# Patient Record
Sex: MALE | Race: WHITE
[De-identification: names, ages, dates, MRNs, and addresses within clinical notes are randomized per-mention and may not be internally consistent; named-entity substitution may affect disease eponyms.]

---

## 2022-01-19 ENCOUNTER — HOSPITAL ENCOUNTER (OUTPATIENT)
Dept: HOSPITAL 47 - LABWHC1 | Age: 62
Discharge: HOME | End: 2022-01-19
Attending: INTERNAL MEDICINE
Payer: COMMERCIAL

## 2022-01-19 ENCOUNTER — HOSPITAL ENCOUNTER (OUTPATIENT)
Dept: HOSPITAL 47 - LABPAT | Age: 62
Discharge: HOME | End: 2022-01-19
Attending: UROLOGY
Payer: COMMERCIAL

## 2022-01-19 DIAGNOSIS — R35.0: ICD-10-CM

## 2022-01-19 DIAGNOSIS — C61: ICD-10-CM

## 2022-01-19 DIAGNOSIS — I10: Primary | ICD-10-CM

## 2022-01-19 DIAGNOSIS — E78.5: ICD-10-CM

## 2022-01-19 DIAGNOSIS — Z01.812: Primary | ICD-10-CM

## 2022-01-19 LAB
BASOPHILS # BLD AUTO: 0 K/UL (ref 0–0.2)
BASOPHILS NFR BLD AUTO: 1 %
CHOLEST SERPL-MCNC: 184 MG/DL (ref 0–200)
EOSINOPHIL # BLD AUTO: 0.1 K/UL (ref 0–0.7)
EOSINOPHIL NFR BLD AUTO: 2 %
ERYTHROCYTE [DISTWIDTH] IN BLOOD BY AUTOMATED COUNT: 4.77 M/UL (ref 4.3–5.9)
ERYTHROCYTE [DISTWIDTH] IN BLOOD: 13.1 % (ref 11.5–15.5)
HCT VFR BLD AUTO: 47 % (ref 39–53)
HDLC SERPL-MCNC: 109 MG/DL (ref 40–60)
HGB BLD-MCNC: 15.8 GM/DL (ref 13–17.5)
LDLC SERPL CALC-MCNC: 62.4 MG/DL (ref 0–131)
LYMPHOCYTES # SPEC AUTO: 0.7 K/UL (ref 1–4.8)
LYMPHOCYTES NFR SPEC AUTO: 14 %
MCH RBC QN AUTO: 33.2 PG (ref 25–35)
MCHC RBC AUTO-ENTMCNC: 33.6 G/DL (ref 31–37)
MCV RBC AUTO: 98.7 FL (ref 80–100)
MONOCYTES # BLD AUTO: 0.3 K/UL (ref 0–1)
MONOCYTES NFR BLD AUTO: 5 %
NEUTROPHILS # BLD AUTO: 3.9 K/UL (ref 1.3–7.7)
NEUTROPHILS NFR BLD AUTO: 77 %
PH UR: 5.5 [PH] (ref 5–8)
PLATELET # BLD AUTO: 144 K/UL (ref 150–450)
SP GR UR: 1.01 (ref 1–1.03)
TRIGL SERPL-MCNC: 63 MG/DL (ref 0–149)
UROBILINOGEN UR QL STRIP: <2 MG/DL (ref ?–2)
VLDLC SERPL CALC-MCNC: 12.6 MG/DL (ref 5–40)
WBC # BLD AUTO: 5 K/UL (ref 3.8–10.6)

## 2022-01-19 PROCEDURE — 80053 COMPREHEN METABOLIC PANEL: CPT

## 2022-01-19 PROCEDURE — 83721 ASSAY OF BLOOD LIPOPROTEIN: CPT

## 2022-01-19 PROCEDURE — 81003 URINALYSIS AUTO W/O SCOPE: CPT

## 2022-01-19 PROCEDURE — 36415 COLL VENOUS BLD VENIPUNCTURE: CPT

## 2022-01-19 PROCEDURE — 80061 LIPID PANEL: CPT

## 2022-01-19 PROCEDURE — 87086 URINE CULTURE/COLONY COUNT: CPT

## 2022-01-19 PROCEDURE — 85025 COMPLETE CBC W/AUTO DIFF WBC: CPT

## 2022-01-20 LAB
ALBUMIN SERPL-MCNC: 4.7 G/DL (ref 3.8–4.9)
ALP SERPL-CCNC: 62 U/L (ref 41–126)
ALT SERPL-CCNC: 26 U/L (ref 10–49)
ANION GAP SERPL CALC-SCNC: 11.3 MMOL/L (ref 10–18)
AST SERPL-CCNC: 25 U/L (ref 14–35)
BUN SERPL-SCNC: 17.5 MG/DL (ref 9–27)
CALCIUM SPEC-MCNC: 9.9 MG/DL (ref 8.7–10.3)
CHLORIDE SERPL-SCNC: 98 MMOL/L (ref 96–109)
CO2 SERPL-SCNC: 25.5 MMOL/L (ref 20–27.5)
GLUCOSE SERPL-MCNC: 80 MG/DL (ref 70–110)
POTASSIUM SERPL-SCNC: 3.9 MMOL/L (ref 3.5–5.5)
PROT SERPL-MCNC: 7 G/DL (ref 6.2–8.2)
SODIUM SERPL-SCNC: 135 MMOL/L (ref 135–145)

## 2022-01-27 NOTE — P.HPIHPCON
History of Present Illness


H&P Date: 01/27/22


Chief Complaint: Prostate cancer


This is a 61-year-old male with history of high-volume [place] 6 prostate 

cancer, PSA of 16.2.  Option of continued observation, robotic prostatectomy, 

and radiation were discussed with him in detail.  Risk and benefit of each 

approach was discussed.  He agreed to proceed with a robotic radical 

prostatectomy.  Discussed with him the risk which includes but not limited to 

bleeding, infection, injury to nearby organs which includes but not limited to 

bladder, urethra, rectum, bowel.  Discussed the risk of urinary incontinence and

erectile dysfunction.  Discussed potential of cancer recurrence even with 

surgical removal.  Discussed also risk from anesthesia which includes but not 

limited to heart attack, stroke, blood clots.  He understood all the risk and 

agreed to proceed with robotic-assisted laparoscopic prostatectomy





Consent for Procedure: 


I have explained the operation/procedure to the patient, including the risks, 

benefits, side effects, alternative therapies (including not receiving the 

proposed treatment or service), the likelihood of the patient achieving his/her 

goals, and potential recuperation problems for the procedure/sedation/analgesia,

as well as any blood products, if indicated. I also explained to the patient the

risks, benefits and side effects of the alternatives, as well as the risks 

related to not receiving the proposed procedure, care, treatment, or services.








Past Medical History


Past Medical History: Atrial Fibrillation, Cancer, Hyperlipidemia, Hypertension,

Thyroid Disorder


Additional Past Medical History / Comment(s): recent hx. of prostate cancer


History of Any Multi-Drug Resistant Organisms: None Reported


Additional Past Surgical History / Comment(s): cardioversion


Past Anesthesia/Blood Transfusion Reactions: No Reported Reaction


Smoking Status: Never smoker





Medications and Allergies


                                Home Medications











 Medication  Instructions  Recorded  Confirmed  Type


 


Levothyroxine Sodium [Synthroid] 50 mcg PO DAILY 06/20/19 01/25/22 History


 


Rivaroxaban [Xarelto] 20 mg PO DAILY 06/20/19 01/25/22 History


 


Spironolactone 25 mg PO DAILY #90 tablet 06/24/19 01/25/22 Rx


 


Losartan Potassium [Cozaar] 25 mg PO DAILY 01/25/22 01/25/22 History


 


Metoprolol Tartrate [Lopressor] 12.5 mg PO BID 01/25/22 01/25/22 History


 


Rosuvastatin [Crestor] 10 mg PO DAILY 01/25/22 01/25/22 History


 


hydroCHLOROthiazide [Hydrodiuril] 25 mg PO DAILY 01/25/22 01/25/22 History








                                    Allergies











Allergy/AdvReac Type Severity Reaction Status Date / Time


 


No Known Allergies Allergy   Verified 01/25/22 12:08














Surgical - Exam





- General


no distress, no pain





- Eyes


normal ocular movement, no pale





- ENT


normal nares, normal mucosa





- Respiratory


normal expansion, normal respiratory effort





- Abdomen


Abdomen: soft, non tender





- Psychiatric


oriented to time, oriented to person, oriented to place





Assessment and Plan


Assessment: 


61-year-old male with history of Radiant 6 prostate cancer


OR for robotic prostatectomy

## 2022-01-28 ENCOUNTER — HOSPITAL ENCOUNTER (OUTPATIENT)
Dept: HOSPITAL 47 - OR | Age: 62
Setting detail: OBSERVATION
LOS: 1 days | Discharge: HOME | End: 2022-01-29
Attending: UROLOGY | Admitting: UROLOGY
Payer: COMMERCIAL

## 2022-01-28 VITALS — BODY MASS INDEX: 22.1 KG/M2

## 2022-01-28 DIAGNOSIS — E07.9: ICD-10-CM

## 2022-01-28 DIAGNOSIS — Z79.01: ICD-10-CM

## 2022-01-28 DIAGNOSIS — I10: ICD-10-CM

## 2022-01-28 DIAGNOSIS — I42.0: ICD-10-CM

## 2022-01-28 DIAGNOSIS — Z98.890: ICD-10-CM

## 2022-01-28 DIAGNOSIS — Z79.890: ICD-10-CM

## 2022-01-28 DIAGNOSIS — C61: Primary | ICD-10-CM

## 2022-01-28 DIAGNOSIS — Z79.899: ICD-10-CM

## 2022-01-28 DIAGNOSIS — I48.91: ICD-10-CM

## 2022-01-28 DIAGNOSIS — E78.5: ICD-10-CM

## 2022-01-28 DIAGNOSIS — I48.3: ICD-10-CM

## 2022-01-28 LAB
ANION GAP SERPL CALC-SCNC: 4 MMOL/L
BUN SERPL-SCNC: 16 MG/DL (ref 9–20)
CALCIUM SPEC-MCNC: 9.1 MG/DL (ref 8.4–10.2)
CHLORIDE SERPL-SCNC: 97 MMOL/L (ref 98–107)
CO2 SERPL-SCNC: 30 MMOL/L (ref 22–30)
GLUCOSE SERPL-MCNC: 96 MG/DL (ref 74–99)
POTASSIUM SERPL-SCNC: 4.5 MMOL/L (ref 3.5–5.1)
SODIUM SERPL-SCNC: 131 MMOL/L (ref 137–145)

## 2022-01-28 PROCEDURE — 86900 BLOOD TYPING SEROLOGIC ABO: CPT

## 2022-01-28 PROCEDURE — 86901 BLOOD TYPING SEROLOGIC RH(D): CPT

## 2022-01-28 PROCEDURE — 87635 SARS-COV-2 COVID-19 AMP PRB: CPT

## 2022-01-28 PROCEDURE — 55866 LAPS SURG PRST8ECT RPBIC RAD: CPT

## 2022-01-28 PROCEDURE — 88305 TISSUE EXAM BY PATHOLOGIST: CPT

## 2022-01-28 PROCEDURE — 64999 UNLISTED PX NERVOUS SYSTEM: CPT

## 2022-01-28 PROCEDURE — 86850 RBC ANTIBODY SCREEN: CPT

## 2022-01-28 PROCEDURE — 80048 BASIC METABOLIC PNL TOTAL CA: CPT

## 2022-01-28 PROCEDURE — 88309 TISSUE EXAM BY PATHOLOGIST: CPT

## 2022-01-28 RX ADMIN — POTASSIUM CHLORIDE SCH MLS: 14.9 INJECTION, SOLUTION INTRAVENOUS at 06:24

## 2022-01-28 RX ADMIN — METOPROLOL TARTRATE SCH MG: 25 TABLET, FILM COATED ORAL at 19:51

## 2022-01-28 RX ADMIN — KETOROLAC TROMETHAMINE SCH MG: 30 INJECTION, SOLUTION INTRAMUSCULAR at 17:21

## 2022-01-28 RX ADMIN — KETOROLAC TROMETHAMINE SCH MG: 30 INJECTION, SOLUTION INTRAMUSCULAR at 13:01

## 2022-01-28 RX ADMIN — ATORVASTATIN CALCIUM SCH MG: 20 TABLET, FILM COATED ORAL at 16:13

## 2022-01-28 RX ADMIN — HEPARIN SODIUM SCH UNIT: 5000 INJECTION INTRAVENOUS; SUBCUTANEOUS at 16:13

## 2022-01-28 RX ADMIN — DEXTROSE MONOHYDRATE, SODIUM CHLORIDE, AND POTASSIUM CHLORIDE SCH MLS/HR: 50; 4.5; 1.49 INJECTION, SOLUTION INTRAVENOUS at 19:33

## 2022-01-28 RX ADMIN — DEXTROSE MONOHYDRATE, SODIUM CHLORIDE, AND POTASSIUM CHLORIDE SCH MLS/HR: 50; 4.5; 1.49 INJECTION, SOLUTION INTRAVENOUS at 13:02

## 2022-01-28 NOTE — P.ANPRN
Procedure Note - Anesthesia





- Nerve Block Performed


  ** Bilateral Erector Spinae Single


Time Out Performed: Yes


Date of Procedure: 01/28/22


Procedure Start Time: 07:11


Location of Patient: PreOp


Indication: Acute Post-Operative Pain, Requested by Surgeon


Sedation Type: Sedate with meaningful contact maintained


Preparation: Sterile Prep, Sterile Dressing


Position: Prone


Needle Types: Facet


Needle Gauge: 20, 21


Ultrasound used to visualize needle placement: Yes


Ultrasound used to observe medication spread: Yes


Injectate: 0.5% Ropivacaine (see comment for volume) (15 ml + decadron 4 mg per 

side)


Blood Aspirated: No


Pain Paresthesia on Injection Noted: No


Resistance on Injection: Normal


Image Stored and Saved: Yes


Events: Uneventful and Well Tolerated

## 2022-01-28 NOTE — P.OP
Date of Procedure: 01/28/22


Preoperative Diagnosis: 


prostate Cancer 


Postoperative Diagnosis: 


same 


Procedure(s) Performed: 


robotic assisted laproscopic prostatectomy 


Implants: 


none


Anesthesia: MIRIAM


Surgeon: Floyd Prince


Estimated Blood Loss (ml): 150


Pathology: other (prostate and bilateral seminal vesicle)


Condition: stable


Disposition: PACU


Indications for Procedure: 


This is a 61-year-old male with history of high-volume [place] 6 prostate 

cancer, PSA of 16.2.  Option of continued observation, robotic prostatectomy, 

and radiation were discussed with him in detail.  Risk and benefit of each 

approach was discussed.  He agreed to proceed with a robotic radical prostatecto

my.  Discussed with him the risk which includes but not limited to bleeding, 

infection, injury to nearby organs which includes but not limited to bladder, 

urethra, rectum, bowel.  Discussed the risk of urinary incontinence and erectile

dysfunction.  Discussed potential of cancer recurrence even with surgical 

removal.  Discussed also risk from anesthesia which includes but not limited to 

heart attack, stroke, blood clots.  He understood all the risk and agreed to 

proceed with robotic-assisted laparoscopic prostatectomy


Description of Procedure: 


After preoperative antibiotics were started, the patient was taken to the 

operating room. Anesthesia was induced and the patient was placed in  supine  

position, with adequate padding of the pressure points, shoulders, back, legs 

and arms. He was then prepped and draped in the standard fashion. A critical 

pause was performed using two patient identifiers.





A 16F farr catheter was placed to gravity drainage. A pneumo-peritoneum was 

created with placement of a Veress needle to 20 mm Hg without complication, and 

a 8 Fr trocar was placed above the umbillicus.  Under direct vision a 8mm 

robotic ports was placed lateral to each rectus slightly below the camera port. 

The left iliac fossa 8mm port was placed.  The right assistant right iliac fossa

12mm port and right paramedian 5mm portwere placed.  





After the patient was placed in the trendelenberg position, the robot was then 

docked to the 8mm robotic ports and then each robotic arm and tower was checked 

in relation to the patient's legs and hands to avoid inadvertent compression. 

The peritoneal cavity was inspected.  Adhesions  were  taken down along the left

lower quadrant sharply.





 An inverted U-shaped incision began laterally to the left medial umbilical 

ligament and extended high across the midline to the right umbilical ligament. 

The limbs of the "U" extended to the level of the vasa on both sides. We next 

developed the preperitoneal space and the space of Retzius. 





Cautery was used to dissected the bladder away from the prostate. After the 

anterior bladder neck was incised and the bladder entered the the posterior 

bladder neck was exposed and the ureteral orifces identified. The posterior 

bladder neck was then incised and dissected away from the prostate. The vas and 

the seminal vesicles were now exposed and dissected to their insertions into the

prostate and were not spared. The posterior layer of the Denonvillier's fascia 

was incised to enter ang the plane between prostate and perirectal fat. 





Each lateral pedicle was controlled with clips and cautery for hemostasis. Nerve

preservation was performed, complete nerve preservation was performed 

bilaterally  The puboprostatic ligament was incised where it inserted into the a

pex of the prostate and a plane between urethra and dorsal venous complex 

developed to expose the anterior urethral surface. The anterior wall of the 

urethra was transected with the cut setting a few millimeters distal to the apex

of the prostate. The dorsal vein was ligated using 3-0 V lock 


 





The urethrovesical anastomosis was performed . the posterior denovillers was 

reapproximated using 3-0 V lock.   A 6 and 6 inch 3-0 V-Lock suture was used to 

anastomose the urethra and bladder, starting at the 6:00 posterior position.  

Mucosa was secured in every stitch, to ensure a mucosa to mucosa anastomosis.  

The stitch was regularly cinched and the anastomosis tightened.  Care was taken 

to not violate the ureteral orifices.    The Farr catheter was advanced, the 

bladder filled, and the anastomosis was tested, as described above.  Anastomsis 

was watertight at 150mL





The periumbilical fascia was closed with 1-0-PDS suture in figure-of-eight 

fashion All ports were closed with a subcuticular 4-0 monocryl and Dermabond. 

Sponge, instrument, and needle counts were correct at the end of the case x2. 

All specimens including prostate and lymph nodes were sent to pathology for 

diagnosis and will be available in a week.





The patient tolerated the surgery well and without complication. He awoke 

without difficulty and was taken to the recovery room in stable condition

## 2022-01-29 VITALS
HEART RATE: 72 BPM | DIASTOLIC BLOOD PRESSURE: 85 MMHG | SYSTOLIC BLOOD PRESSURE: 152 MMHG | RESPIRATION RATE: 16 BRPM | TEMPERATURE: 98.3 F

## 2022-01-29 RX ADMIN — HEPARIN SODIUM SCH UNIT: 5000 INJECTION INTRAVENOUS; SUBCUTANEOUS at 00:36

## 2022-01-29 RX ADMIN — KETOROLAC TROMETHAMINE SCH MG: 30 INJECTION, SOLUTION INTRAMUSCULAR at 00:36

## 2022-01-29 RX ADMIN — HEPARIN SODIUM SCH UNIT: 5000 INJECTION INTRAVENOUS; SUBCUTANEOUS at 08:28

## 2022-01-29 RX ADMIN — POTASSIUM CHLORIDE SCH: 14.9 INJECTION, SOLUTION INTRAVENOUS at 07:05

## 2022-01-29 RX ADMIN — ATORVASTATIN CALCIUM SCH MG: 20 TABLET, FILM COATED ORAL at 08:27

## 2022-01-29 RX ADMIN — KETOROLAC TROMETHAMINE SCH: 30 INJECTION, SOLUTION INTRAMUSCULAR at 06:32

## 2022-01-29 RX ADMIN — METOPROLOL TARTRATE SCH MG: 25 TABLET, FILM COATED ORAL at 08:27

## 2022-01-29 RX ADMIN — DEXTROSE MONOHYDRATE, SODIUM CHLORIDE, AND POTASSIUM CHLORIDE SCH: 50; 4.5; 1.49 INJECTION, SOLUTION INTRAVENOUS at 06:32

## 2022-01-29 NOTE — P.DS
Providers


Date of admission: 


01/29/22 02:06





Attending physician: 


Floyd Prince MD





Primary care physician: 


[facility] Course: 





The patient was admitted for a robotic-assisted radical prostatectomy.  He 

underwent this without difficulty.  His postoperative course was uneventful.  He

tolerated diet.  His vital signs are stable.  His abdomen soft.  His pain is 

controlled.  The urine is clear.  His discharged home today with the Barbour 

catheter.  It'll stay until his follow-up APPOINTMENT.  Pathology is pending.  

Postoperative instructions been given.  Tylenol Motrin for pain.  Hold Xarelto 

until Thursday


Patient Condition at Discharge: Good





Plan - Discharge Summary


Discharge Rx Participant: Yes


New Discharge Prescriptions: 


No Action


   Rivaroxaban [Xarelto] 20 mg PO DAILY


   Levothyroxine Sodium [Synthroid] 50 mcg PO DAILY


   Spironolactone 25 mg PO DAILY #90 tablet


   hydroCHLOROthiazide [Hydrodiuril] 25 mg PO DAILY


   Rosuvastatin [Crestor] 10 mg PO DAILY


   Losartan Potassium [Cozaar] 25 mg PO DAILY


   Metoprolol Tartrate [Lopressor] 12.5 mg PO BID


Discharge Medication List





Levothyroxine Sodium [Synthroid] 50 mcg PO DAILY 06/20/19 [History]


Rivaroxaban [Xarelto] 20 mg PO DAILY 06/20/19 [History]


Spironolactone 25 mg PO DAILY #90 tablet 06/24/19 [Rx]


Losartan Potassium [Cozaar] 25 mg PO DAILY 01/25/22 [History]


Metoprolol Tartrate [Lopressor] 12.5 mg PO BID 01/25/22 [History]


Rosuvastatin [Crestor] 10 mg PO DAILY 01/25/22 [History]


hydroCHLOROthiazide [Hydrodiuril] 25 mg PO DAILY 01/25/22 [History]








Activity/Diet/Wound Care/Special Instructions: 


Home with Barbour, bed side and leg bag.  Please instruct.  Patient should hold 

his xarelto until next Thursday


Discharge Disposition: HOME SELF-CARE